# Patient Record
Sex: FEMALE | NOT HISPANIC OR LATINO | URBAN - METROPOLITAN AREA
[De-identification: names, ages, dates, MRNs, and addresses within clinical notes are randomized per-mention and may not be internally consistent; named-entity substitution may affect disease eponyms.]

---

## 2023-06-26 LAB — SARS-COV-2 RESULT: DETECTED

## 2023-11-28 ENCOUNTER — OFFICE VISIT (OUTPATIENT)
Dept: OBSTETRICS AND GYNECOLOGY | Facility: CLINIC | Age: 28
End: 2023-11-28
Payer: COMMERCIAL

## 2023-11-28 VITALS
WEIGHT: 143 LBS | HEIGHT: 69 IN | BODY MASS INDEX: 21.18 KG/M2 | DIASTOLIC BLOOD PRESSURE: 78 MMHG | SYSTOLIC BLOOD PRESSURE: 117 MMHG

## 2023-11-28 DIAGNOSIS — Z13.220 LIPID SCREENING: ICD-10-CM

## 2023-11-28 DIAGNOSIS — Z31.89 ENCOUNTER FOR FERTILITY PLANNING: ICD-10-CM

## 2023-11-28 DIAGNOSIS — Z01.419 WELL WOMAN EXAM WITH ROUTINE GYNECOLOGICAL EXAM: Primary | ICD-10-CM

## 2023-11-28 PROCEDURE — 99385 PREV VISIT NEW AGE 18-39: CPT | Performed by: OBSTETRICS & GYNECOLOGY

## 2023-11-28 PROCEDURE — 87624 HPV HI-RISK TYP POOLED RSLT: CPT

## 2023-11-28 PROCEDURE — 88175 CYTOPATH C/V AUTO FLUID REDO: CPT

## 2023-11-28 PROCEDURE — 1036F TOBACCO NON-USER: CPT | Performed by: OBSTETRICS & GYNECOLOGY

## 2023-11-28 RX ORDER — OMEPRAZOLE 40 MG/1
40 CAPSULE, DELAYED RELEASE ORAL AS NEEDED
COMMUNITY

## 2023-11-28 RX ORDER — DEXTROAMPHETAMINE SACCHARATE, AMPHETAMINE ASPARTATE, DEXTROAMPHETAMINE SULFATE AND AMPHETAMINE SULFATE 2.5; 2.5; 2.5; 2.5 MG/1; MG/1; MG/1; MG/1
10 TABLET ORAL AS NEEDED
COMMUNITY
Start: 2019-07-24

## 2023-11-28 ASSESSMENT — PAIN SCALES - GENERAL: PAINLEVEL: 0-NO PAIN

## 2023-11-28 NOTE — PROGRESS NOTES
History Of Present Illness  Routine Gyn Exam  Kaitlyn Olivo here for routine WWE. 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  Her only complaint with the Nexplanon has been acne.  Dysphoria before periods is improved.  Stopped OCPs due to Migraines with aura.    Pt is recently  and is not considering child birth now.  She would like to discuss egg freezing. She does not have a defined timeline-- maybe at least 3-4 years from now.    Her partner also has alpha-thallasemia trait (no issues).     Gynecologic History  Patient's last menstrual period was 2023 (exact date).  Contraception: Nexplanon placed 2022  Last Pap: . Results were:  normal  Last mammogram: NA. Results were: NA  Last Colonoscopy:  NA. Results were: NA  Last DEXA: NA. Results were: NA  Lipid/DM: NA    Obstetric History  OB History    Para Term  AB Living   0 0 0 0 0 0   SAB IAB Ectopic Multiple Live Births   0 0 0 0 0        Past Medical History  She has a past medical history of Allergy status to unspecified drugs, medicaments and biological substances, Attention deficit disorder (), Other specified health status, Personal history of other diseases of the nervous system and sense organs, Personal history of other drug therapy, and Personal history of other infectious and parasitic diseases.    Surgical History  She has a past surgical history that includes [place] tooth extraction ().     Social History  She reports that she has never smoked. She has never used smokeless tobacco. She reports current alcohol use. She reports that she does not use drugs.    Family History  No family history on file.     Allergies  Amoxicillin-pot clavulanate, Levonorgestrel-ethinyl estrad, and Pollen extracts       Physical Exam  Constitutional:       Appearance: Normal appearance.   Pulmonary:      Effort: Pulmonary effort is normal.   Chest:   Breasts:     Right: Normal. No mass, nipple discharge or skin change.      Left: Normal. No  "mass, nipple discharge or skin change.   Abdominal:      General: Abdomen is flat. There is no distension.      Palpations: Abdomen is soft.      Tenderness: There is no abdominal tenderness.   Genitourinary:     Labia:         Right: No rash, tenderness or lesion.         Left: No rash, tenderness or lesion.       Urethra: No prolapse.      Vagina: Normal. No vaginal discharge or bleeding.      Cervix: No friability or lesion.      Uterus: Normal. Not enlarged and not tender.       Adnexa:         Right: No mass, tenderness or fullness.          Left: No mass, tenderness or fullness.              Comments: Ectropion noted at 10-12 PM  Neurological:      General: No focal deficit present.      Mental Status: She is alert.   Psychiatric:         Mood and Affect: Mood normal.          Last Recorded Vitals  Blood pressure 117/78, height 1.753 m (5' 9\"), weight 64.9 kg (143 lb), last menstrual period 11/17/2023.    Assessment/Plan   Problem List Items Addressed This Visit    None  Visit Diagnoses       Well woman exam with routine gynecological exam    -  Primary    Relevant Orders    Lipid Panel    THINPREP PAP TEST    TSH with reflex to Free T4 if abnormal    CBC    Encounter for fertility planning        Relevant Orders    Hemoglobin Identification with Path Review    Lipid screening        Relevant Orders    Lipid Panel            Contraception: Nexplanon  PAP: obtained   Mammogram: NA   Colonoscopy:  NA   DEXA: NA   Lipid/DM: ordered    [facility] referral placed. Will also check Hgb electrophoresis and CBC for initial thalassemia screen. Pt will check to see how her spouse was diagnosed (what type of testing was performed).     Anmol Machado MD  "

## 2023-12-07 ENCOUNTER — LAB (OUTPATIENT)
Dept: LAB | Facility: LAB | Age: 28
End: 2023-12-07
Payer: COMMERCIAL

## 2023-12-07 DIAGNOSIS — Z01.419 WELL WOMAN EXAM WITH ROUTINE GYNECOLOGICAL EXAM: ICD-10-CM

## 2023-12-07 DIAGNOSIS — Z13.220 LIPID SCREENING: ICD-10-CM

## 2023-12-07 DIAGNOSIS — Z31.89 ENCOUNTER FOR FERTILITY PLANNING: ICD-10-CM

## 2023-12-07 LAB
CHOLEST SERPL-MCNC: 157 MG/DL (ref 0–199)
CHOLESTEROL/HDL RATIO: 2.3
ERYTHROCYTE [DISTWIDTH] IN BLOOD BY AUTOMATED COUNT: 12.1 % (ref 11.5–14.5)
HCT VFR BLD AUTO: 39.9 % (ref 36–46)
HDLC SERPL-MCNC: 68.7 MG/DL
HGB BLD-MCNC: 13 G/DL (ref 12–16)
LDLC SERPL CALC-MCNC: 79 MG/DL
MCH RBC QN AUTO: 28.3 PG (ref 26–34)
MCHC RBC AUTO-ENTMCNC: 32.6 G/DL (ref 32–36)
MCV RBC AUTO: 87 FL (ref 80–100)
NON HDL CHOLESTEROL: 88 MG/DL (ref 0–149)
NRBC BLD-RTO: 0 /100 WBCS (ref 0–0)
PLATELET # BLD AUTO: 321 X10*3/UL (ref 150–450)
RBC # BLD AUTO: 4.59 X10*6/UL (ref 4–5.2)
TRIGL SERPL-MCNC: 47 MG/DL (ref 0–149)
TSH SERPL-ACNC: 1.61 MIU/L (ref 0.44–3.98)
VLDL: 9 MG/DL (ref 0–40)
WBC # BLD AUTO: 6 X10*3/UL (ref 4.4–11.3)

## 2023-12-07 PROCEDURE — 83021 HEMOGLOBIN CHROMOTOGRAPHY: CPT

## 2023-12-07 PROCEDURE — 85027 COMPLETE CBC AUTOMATED: CPT

## 2023-12-07 PROCEDURE — 84443 ASSAY THYROID STIM HORMONE: CPT

## 2023-12-07 PROCEDURE — 36415 COLL VENOUS BLD VENIPUNCTURE: CPT

## 2023-12-07 PROCEDURE — 80061 LIPID PANEL: CPT

## 2023-12-07 PROCEDURE — 83020 HEMOGLOBIN ELECTROPHORESIS: CPT | Performed by: OBSTETRICS & GYNECOLOGY

## 2023-12-11 LAB
CYTOLOGY CMNT CVX/VAG CYTO-IMP: NORMAL
HEMOGLOBIN A2: 3.2 % (ref 2–3.5)
HEMOGLOBIN A: 96.3 % (ref 95.8–98)
HEMOGLOBIN F: 0.5 % (ref 0–2)
HEMOGLOBIN IDENTIFICATION INTERPRETATION: NORMAL
HPV HR 12 DNA GENITAL QL NAA+PROBE: NEGATIVE
HPV HR GENOTYPES PNL CVX NAA+PROBE: NEGATIVE
HPV16 DNA SPEC QL NAA+PROBE: NEGATIVE
HPV18 DNA SPEC QL NAA+PROBE: NEGATIVE
LAB AP HPV GENOTYPE QUESTION: YES
LAB AP HPV HR: NORMAL
LABORATORY COMMENT REPORT: NORMAL
LMP START DATE: NORMAL
PATH REPORT.TOTAL CANCER: NORMAL
PATH REVIEW-HGB IDENTIFICATION: NORMAL

## 2024-05-13 ENCOUNTER — OFFICE VISIT (OUTPATIENT)
Dept: PRIMARY CARE | Facility: CLINIC | Age: 29
End: 2024-05-13
Payer: COMMERCIAL

## 2024-05-13 VITALS
HEIGHT: 69 IN | HEART RATE: 67 BPM | DIASTOLIC BLOOD PRESSURE: 70 MMHG | SYSTOLIC BLOOD PRESSURE: 102 MMHG | WEIGHT: 141 LBS | BODY MASS INDEX: 20.88 KG/M2 | OXYGEN SATURATION: 97 %

## 2024-05-13 DIAGNOSIS — Z11.59 NEED FOR HEPATITIS C SCREENING TEST: ICD-10-CM
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
## 2024-05-13 DIAGNOSIS — Z14.8 CARRIER OF HEMOCHROMATOSIS HFE GENE MUTATION: Primary | ICD-10-CM

## 2024-05-13 DIAGNOSIS — Z11.3 ROUTINE SCREENING FOR STI (SEXUALLY TRANSMITTED INFECTION): ICD-10-CM

## 2024-05-13 PROCEDURE — 99385 PREV VISIT NEW AGE 18-39: CPT | Performed by: STUDENT IN AN ORGANIZED HEALTH CARE EDUCATION/TRAINING PROGRAM

## 2024-05-13 NOTE — PROGRESS NOTES
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  -Vegan diet  -No smoking     Family history:  -Biologically related to mother Sera who has history skin cancer  -Cardiac- grandfather with quadruple bypass   -Dementia  -No colon or breast cancer    Objective   Visit Vitals  /70   Pulse 67   Ht 1.753 m (5' 9\")   Wt 64 kg (141 lb)   SpO2 97%   BMI 20.82 kg/m²   OB Status Having periods   Smoking Status Never   BSA 1.77 m²      Physical Exam  General: Well appearing, conversational, in no acute distress  HEENT: EOMI, PERRL, nares patent without congestion, MMM  CV: RRR, no murmurs  Resp: Lungs CTAB, normal work of breathing  GI: Soft, nondistended, nontender, BS+   Ext: No lower ext swelling  Skin: Warm, dry, no rashes  Neuro: Awake, alert, oriented x3, moving all 4 extremities, nonfocal, normal gait, ambulates without assistance  Psych: Appropriate mood and affect      Assessment/Plan   Kaitlyn Olivo 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 Doing well without complaints today. Will get CMP and iron studies due to possible hemachromatosis variant seen on 23 and Me. She got labwork in December, will also get hep C, syphilis, and HIV screening labs to complete annual labs. Discussed trying to take omeprazole daily for 6 weeks to see " if this helps her GERD symptoms.     Problem List Items Addressed This Visit    None  Visit Diagnoses       Carrier of hemochromatosis HFE gene mutation    -  Primary    Relevant Orders    Comprehensive metabolic panel    Ferritin    Iron and TIBC    Need for hepatitis C screening test        Relevant Orders    Hepatitis C Antibody    Accident caused by hypodermic needle, subsequent encounter        Relevant Orders    Hepatitis C Antibody    HIV 1/2 Antigen/Antibody Screen with Reflex to Confirmation    Routine screening for STI (sexually transmitted infection)        Relevant Orders    Syphilis Screen with Reflex                 Courtney Mak MD MPH

## 2024-05-13 NOTE — PATIENT INSTRUCTIONS
Thank you for coming today Kaitlyn!    Please get your blood work done at any  lab. There is a lab on the lower level [address].    We discussed trying to take omeprazole every morning and vitamin at night.    I will see you once a year and as needed!    Please don't hesitate to reach out if you need anything!

## 2024-06-04 DIAGNOSIS — F41.9 ANXIETY: Primary | ICD-10-CM

## 2024-06-04 RX ORDER — SERTRALINE HYDROCHLORIDE 50 MG/1
50 TABLET, FILM COATED ORAL DAILY
Qty: 30 TABLET | Refills: 1 | Status: SHIPPED | OUTPATIENT
Start: 2024-06-04 | End: 2024-08-03

## 2024-06-18 ENCOUNTER — APPOINTMENT (OUTPATIENT)
Dept: OPHTHALMOLOGY | Facility: CLINIC | Age: 29
End: 2024-06-18
Payer: COMMERCIAL

## 2024-07-09 ENCOUNTER — APPOINTMENT (OUTPATIENT)
Dept: PRIMARY CARE | Facility: CLINIC | Age: 29
End: 2024-07-09
Payer: COMMERCIAL

## 2024-07-24 ENCOUNTER — APPOINTMENT (OUTPATIENT)
Dept: RADIOLOGY | Facility: HOSPITAL | Age: 29
End: 2024-07-24
Payer: COMMERCIAL

## 2024-07-24 ENCOUNTER — HOSPITAL ENCOUNTER (EMERGENCY)
Facility: HOSPITAL | Age: 29
Discharge: HOME | End: 2024-07-24
Attending: EMERGENCY MEDICINE
Payer: COMMERCIAL

## 2024-07-24 VITALS
WEIGHT: 140 LBS | SYSTOLIC BLOOD PRESSURE: 118 MMHG | HEART RATE: 88 BPM | DIASTOLIC BLOOD PRESSURE: 80 MMHG | BODY MASS INDEX: 20.73 KG/M2 | OXYGEN SATURATION: 100 % | TEMPERATURE: 99.1 F | HEIGHT: 69 IN | RESPIRATION RATE: 16 BRPM

## 2024-07-24 DIAGNOSIS — R10.9 FLANK PAIN: ICD-10-CM

## 2024-07-24 DIAGNOSIS — N94.89 ADNEXAL MASS: Primary | ICD-10-CM

## 2024-07-24 DIAGNOSIS — N83.202 CYST OF LEFT OVARY: Primary | ICD-10-CM

## 2024-07-24 LAB
ALBUMIN SERPL BCP-MCNC: 4.5 G/DL (ref 3.4–5)
ALP SERPL-CCNC: 64 U/L (ref 33–110)
ALT SERPL W P-5'-P-CCNC: 14 U/L (ref 7–45)
ANION GAP SERPL CALC-SCNC: 15 MMOL/L (ref 10–20)
APPEARANCE UR: CLEAR
AST SERPL W P-5'-P-CCNC: 18 U/L (ref 9–39)
BASOPHILS # BLD AUTO: 0.05 X10*3/UL (ref 0–0.1)
BASOPHILS NFR BLD AUTO: 0.5 %
BILIRUB SERPL-MCNC: 0.3 MG/DL (ref 0–1.2)
BILIRUB UR STRIP.AUTO-MCNC: NEGATIVE MG/DL
BUN SERPL-MCNC: 7 MG/DL (ref 6–23)
CALCIUM SERPL-MCNC: 9.5 MG/DL (ref 8.6–10.3)
CHLORIDE SERPL-SCNC: 104 MMOL/L (ref 98–107)
CO2 SERPL-SCNC: 24 MMOL/L (ref 21–32)
COLOR UR: NORMAL
CREAT SERPL-MCNC: 0.68 MG/DL (ref 0.5–1.05)
EGFRCR SERPLBLD CKD-EPI 2021: >90 ML/MIN/1.73M*2
EOSINOPHIL # BLD AUTO: 0.26 X10*3/UL (ref 0–0.7)
EOSINOPHIL NFR BLD AUTO: 2.8 %
ERYTHROCYTE [DISTWIDTH] IN BLOOD BY AUTOMATED COUNT: 12.4 % (ref 11.5–14.5)
GLUCOSE SERPL-MCNC: 84 MG/DL (ref 74–99)
GLUCOSE UR STRIP.AUTO-MCNC: NORMAL MG/DL
HCG UR QL IA.RAPID: NEGATIVE
HCT VFR BLD AUTO: 37.8 % (ref 36–46)
HGB BLD-MCNC: 12.5 G/DL (ref 12–16)
IMM GRANULOCYTES # BLD AUTO: 0.01 X10*3/UL (ref 0–0.7)
IMM GRANULOCYTES NFR BLD AUTO: 0.1 % (ref 0–0.9)
KETONES UR STRIP.AUTO-MCNC: NEGATIVE MG/DL
LEUKOCYTE ESTERASE UR QL STRIP.AUTO: NEGATIVE
LYMPHOCYTES # BLD AUTO: 2.92 X10*3/UL (ref 1.2–4.8)
LYMPHOCYTES NFR BLD AUTO: 31.2 %
MCH RBC QN AUTO: 28.5 PG (ref 26–34)
MCHC RBC AUTO-ENTMCNC: 33.1 G/DL (ref 32–36)
MCV RBC AUTO: 86 FL (ref 80–100)
MONOCYTES # BLD AUTO: 0.37 X10*3/UL (ref 0.1–1)
MONOCYTES NFR BLD AUTO: 4 %
NEUTROPHILS # BLD AUTO: 5.74 X10*3/UL (ref 1.2–7.7)
NEUTROPHILS NFR BLD AUTO: 61.4 %
NITRITE UR QL STRIP.AUTO: NEGATIVE
NRBC BLD-RTO: 0 /100 WBCS (ref 0–0)
PH UR STRIP.AUTO: 7.5 [PH]
PLATELET # BLD AUTO: 273 X10*3/UL (ref 150–450)
POTASSIUM SERPL-SCNC: 3.5 MMOL/L (ref 3.5–5.3)
PROT SERPL-MCNC: 6.7 G/DL (ref 6.4–8.2)
PROT UR STRIP.AUTO-MCNC: NEGATIVE MG/DL
RBC # BLD AUTO: 4.39 X10*6/UL (ref 4–5.2)
RBC # UR STRIP.AUTO: NEGATIVE /UL
SODIUM SERPL-SCNC: 139 MMOL/L (ref 136–145)
SP GR UR STRIP.AUTO: 1.01
UROBILINOGEN UR STRIP.AUTO-MCNC: NORMAL MG/DL
WBC # BLD AUTO: 9.4 X10*3/UL (ref 4.4–11.3)

## 2024-07-24 PROCEDURE — 2500000004 HC RX 250 GENERAL PHARMACY W/ HCPCS (ALT 636 FOR OP/ED): Performed by: NURSE PRACTITIONER

## 2024-07-24 PROCEDURE — 96361 HYDRATE IV INFUSION ADD-ON: CPT

## 2024-07-24 PROCEDURE — 2500000004 HC RX 250 GENERAL PHARMACY W/ HCPCS (ALT 636 FOR OP/ED): Performed by: EMERGENCY MEDICINE

## 2024-07-24 PROCEDURE — 85025 COMPLETE CBC W/AUTO DIFF WBC: CPT | Performed by: NURSE PRACTITIONER

## 2024-07-24 PROCEDURE — 81003 URINALYSIS AUTO W/O SCOPE: CPT | Performed by: NURSE PRACTITIONER

## 2024-07-24 PROCEDURE — 99253 IP/OBS CNSLTJ NEW/EST LOW 45: CPT | Performed by: STUDENT IN AN ORGANIZED HEALTH CARE EDUCATION/TRAINING PROGRAM

## 2024-07-24 PROCEDURE — 76856 US EXAM PELVIC COMPLETE: CPT

## 2024-07-24 PROCEDURE — 96376 TX/PRO/DX INJ SAME DRUG ADON: CPT

## 2024-07-24 PROCEDURE — 74176 CT ABD & PELVIS W/O CONTRAST: CPT | Performed by: RADIOLOGY

## 2024-07-24 PROCEDURE — 84075 ASSAY ALKALINE PHOSPHATASE: CPT | Performed by: NURSE PRACTITIONER

## 2024-07-24 PROCEDURE — 99285 EMERGENCY DEPT VISIT HI MDM: CPT | Mod: 25

## 2024-07-24 PROCEDURE — 81025 URINE PREGNANCY TEST: CPT | Performed by: NURSE PRACTITIONER

## 2024-07-24 PROCEDURE — 74176 CT ABD & PELVIS W/O CONTRAST: CPT

## 2024-07-24 PROCEDURE — 96374 THER/PROPH/DIAG INJ IV PUSH: CPT

## 2024-07-24 PROCEDURE — 36415 COLL VENOUS BLD VENIPUNCTURE: CPT | Performed by: NURSE PRACTITIONER

## 2024-07-24 RX ORDER — KETOROLAC TROMETHAMINE 30 MG/ML
15 INJECTION, SOLUTION INTRAMUSCULAR; INTRAVENOUS ONCE
Status: COMPLETED | OUTPATIENT
Start: 2024-07-24 | End: 2024-07-24

## 2024-07-24 ASSESSMENT — PAIN DESCRIPTION - PAIN TYPE: TYPE: ACUTE PAIN

## 2024-07-24 ASSESSMENT — COLUMBIA-SUICIDE SEVERITY RATING SCALE - C-SSRS
1. IN THE PAST MONTH, HAVE YOU WISHED YOU WERE DEAD OR WISHED YOU COULD GO TO SLEEP AND NOT WAKE UP?: NO
6. HAVE YOU EVER DONE ANYTHING, STARTED TO DO ANYTHING, OR PREPARED TO DO ANYTHING TO END YOUR LIFE?: NO
2. HAVE YOU ACTUALLY HAD ANY THOUGHTS OF KILLING YOURSELF?: NO

## 2024-07-24 ASSESSMENT — PAIN SCALES - GENERAL
PAINLEVEL_OUTOF10: 0 - NO PAIN
PAINLEVEL_OUTOF10: 4

## 2024-07-24 ASSESSMENT — PAIN DESCRIPTION - LOCATION: LOCATION: BACK

## 2024-07-24 ASSESSMENT — PAIN - FUNCTIONAL ASSESSMENT: PAIN_FUNCTIONAL_ASSESSMENT: 0-10

## 2024-07-24 ASSESSMENT — PAIN DESCRIPTION - ORIENTATION: ORIENTATION: LEFT

## 2024-07-24 NOTE — ED TRIAGE NOTES
PT PRESENTS TO THE ED FOR FLANK PAIN. PT ENDORSES MILD CRAMPING IN HER PELVIC AND BACK REGION. PT DENIES FEVERS OR CHILLS. PT DENIES CHEST PAIN OR SOB. PT ENDORSE MILD KIDNEY PAIN ON THE LEFT SIDE.

## 2024-07-24 NOTE — ED TRIAGE NOTES
This patient was seen in triage.     Vitals are noted.      HPI:  Patient is a 29-year-old female presenting to the emergency department with 5 days of left flank pain also with lower abdominal cramping, no history of kidney stones with believes it may be a kidney stone.  Denies any urinary urgency, frequency, burning.  Denies any fever, chills, chest pain, shortness of breath, nausea, vomiting.  Denies any hematuria.  Pain has been constant, denies any traumatic injury.    Focused PE:  Gen: Well-appearing, not in acute distress  Cardiovascular: Regular rate, normal rhythm, no murmur, no gallop  Respiratory: No adventitious lung sounds auscultated.  Abdomen: No reproducible abdominal tenderness upon palpation, + left CVA tenderness, physical exam may be limited by patient positioning sitting up in a chair  Neuro:  Alert and Oriented, speech clear and coherent     Plan:  IV, lab work, urinalysis, imaging        For the remainder of the patient's workup and ED course, please see the main ED provider note.  We discussed need for diagnostic testing including lab studies and imaging.  We also discussed that they may be asked to wait in the waiting room while these test are pending.  They understand that if they choose to leave without having the testing completed or resulted that we cannot rule out acute life-threatening illnesses and the risks involved to lead to worsening condition, permanent disability or even death.

## 2024-07-25 LAB — HOLD SPECIMEN: NORMAL

## 2024-07-25 NOTE — CONSULTS
Inpatient consult to Gynecology  Consult performed by: Damaso Modi MD  Consult ordered by: Mor Franz MD  Reason for consult: concern for Adnexal Torsion  Assessment/Recommendations: Imaging and histroy reviewed. Patient examied by ED provider and myself. No peritoneal signs. Pain is intermittent and not pelvic in location, US with no sonographic e/o torsion. There is a large lielky left adnexal mass c/f funtional ovarian cyst. Suspect that pain is either unrelated to the adnexal mass or due to mass effect. Intermittent torsion remains a possibnility but acute torsion in this setting is unlilkely.     Recommendations:  - OK to DC home with torsion precautions  - Tylenol/motrin ATC  - repeat US in 6 weeks with office follow up at that time.          Reason For Consult  IP GYN HPI/REASON FOR CONSULT: concern for adnexal torsion    History Of Present Illness  Kaitlyn Olivo is a 29 y.o. female presenting with IP GYN HPI/REASON FOR CONSULT: Adnexal Mass. Pt with 4 days of worsening abdominal pain. Pain is worse with deep penetration during sex. No hx of chronic ad or pelvic pain. No PMH/PSH. TVUS showed large cystic 6.5cm left adnedal mass. GYN consulted for r/o torsion     Past Medical History  She has a past medical history of Allergy status to unspecified drugs, medicaments and biological substances, Attention deficit disorder (), Other specified health status, Personal history of other diseases of the nervous system and sense organs, Personal history of other drug therapy, and Personal history of other infectious and parasitic diseases.    Surgical History  She has a past surgical history that includes [place] tooth extraction ().    OB History  OB History    Para Term  AB Living   0 0 0 0 0 0   SAB IAB Ectopic Multiple Live Births   0 0 0 0 0       Sexual History  Social History     Substance and Sexual Activity   Sexual Activity Yes    Partners: Male    Birth control/protection:  "Implant        Social History  She reports that she has never smoked. She has never used smokeless tobacco. She reports current alcohol use. She reports that she does not use drugs.    Family History  No family history on file.     Allergies  Amoxicillin-pot clavulanate, Levonorgestrel-ethinyl estrad, and Pollen extracts    Review of Systems   Genitourinary:  Positive for pelvic pain.        Physical Exam  Vitals reviewed.   Constitutional:       General: She is not in acute distress.     Appearance: She is not ill-appearing.   Pulmonary:      Effort: Pulmonary effort is normal.   Abdominal:      General: Abdomen is flat. There is no distension.      Palpations: Abdomen is soft. There is no mass.      Tenderness: There is no abdominal tenderness. There is no right CVA tenderness, left CVA tenderness, guarding or rebound.   Skin:     Coloration: Skin is not pale.   Neurological:      Mental Status: She is alert.          Last Recorded Vitals  Blood pressure 118/80, pulse 88, temperature 37.3 °C (99.1 °F), resp. rate 16, height 1.753 m (5' 9\"), weight 63.5 kg (140 lb), SpO2 100%.    Relevant Results      Results for orders placed or performed during the hospital encounter of 07/24/24 (from the past 24 hour(s))   CBC and Auto Differential   Result Value Ref Range    WBC 9.4 4.4 - 11.3 x10*3/uL    nRBC 0.0 0.0 - 0.0 /100 WBCs    RBC 4.39 4.00 - 5.20 x10*6/uL    Hemoglobin 12.5 12.0 - 16.0 g/dL    Hematocrit 37.8 36.0 - 46.0 %    MCV 86 80 - 100 fL    MCH 28.5 26.0 - 34.0 pg    MCHC 33.1 32.0 - 36.0 g/dL    RDW 12.4 11.5 - 14.5 %    Platelets 273 150 - 450 x10*3/uL    Neutrophils % 61.4 40.0 - 80.0 %    Immature Granulocytes %, Automated 0.1 0.0 - 0.9 %    Lymphocytes % 31.2 13.0 - 44.0 %    Monocytes % 4.0 2.0 - 10.0 %    Eosinophils % 2.8 0.0 - 6.0 %    Basophils % 0.5 0.0 - 2.0 %    Neutrophils Absolute 5.74 1.20 - 7.70 x10*3/uL    Immature Granulocytes Absolute, Automated 0.01 0.00 - 0.70 x10*3/uL    Lymphocytes " Absolute 2.92 1.20 - 4.80 x10*3/uL    Monocytes Absolute 0.37 0.10 - 1.00 x10*3/uL    Eosinophils Absolute 0.26 0.00 - 0.70 x10*3/uL    Basophils Absolute 0.05 0.00 - 0.10 x10*3/uL   Comprehensive metabolic panel   Result Value Ref Range    Glucose 84 74 - 99 mg/dL    Sodium 139 136 - 145 mmol/L    Potassium 3.5 3.5 - 5.3 mmol/L    Chloride 104 98 - 107 mmol/L    Bicarbonate 24 21 - 32 mmol/L    Anion Gap 15 10 - 20 mmol/L    Urea Nitrogen 7 6 - 23 mg/dL    Creatinine 0.68 0.50 - 1.05 mg/dL    eGFR >90 >60 mL/min/1.73m*2    Calcium 9.5 8.6 - 10.3 mg/dL    Albumin 4.5 3.4 - 5.0 g/dL    Alkaline Phosphatase 64 33 - 110 U/L    Total Protein 6.7 6.4 - 8.2 g/dL    AST 18 9 - 39 U/L    Bilirubin, Total 0.3 0.0 - 1.2 mg/dL    ALT 14 7 - 45 U/L   hCG, Urine, Qualitative   Result Value Ref Range    HCG, Urine NEGATIVE NEGATIVE   Urinalysis with Reflex Culture and Microscopic   Result Value Ref Range    Color, Urine Light-Yellow Light-Yellow, Yellow, Dark-Yellow    Appearance, Urine Clear Clear    Specific Gravity, Urine 1.006 1.005 - 1.035    pH, Urine 7.5 5.0, 5.5, 6.0, 6.5, 7.0, 7.5, 8.0    Protein, Urine NEGATIVE NEGATIVE, 10 (TRACE), 20 (TRACE) mg/dL    Glucose, Urine Normal Normal mg/dL    Blood, Urine NEGATIVE NEGATIVE    Ketones, Urine NEGATIVE NEGATIVE mg/dL    Bilirubin, Urine NEGATIVE NEGATIVE    Urobilinogen, Urine Normal Normal mg/dL    Nitrite, Urine NEGATIVE NEGATIVE    Leukocyte Esterase, Urine NEGATIVE NEGATIVE     US PELVIS TRANSABDOMINAL WITH TRANSVAGINAL    Result Date: 7/24/2024  Interpreted By:  Anupam Avery, STUDY: US PELVIS TRANSABDOMINAL WITH TRANSVAGINAL;  7/24/2024 7:47 pm   INDICATION: Signs/Symptoms:large left adnexal cyst.   COMPARISON: CT scan of the abdomen pelvis 07/24/2024.   ACCESSION NUMBER(S): [id number]   ORDERING CLINICIAN: DENZEL MUNIZ   TECHNIQUE: Transabdominal  and transvaginal sonographic images of the pelvis. Spectral Doppler imaging.   FINDINGS: UTERUS: Measures 8.0 x 4.5 x  4.7 cm and is retroverted in position. No discrete myometrial mass is seen. Cervical nabothian cysts noted. ENDOMETRIUM: Normal thickness, 8 mm. RIGHT OVARY: Measures 4.0 x 2.3 x 1.6 cm. Follicular changes noted with dominant follicle measuring up to 1.7 cm. Normal arterial and venous flow present. LEFT OVARY: Measures 6.6 x 4.4 x 6.8 cm. There is a large anechoic focus in the left ovary with posterior acoustic enhancement measuring 5.7 x 4.0 x 6.2 cm. This corresponds to findings on recent CT and likely relates to a functional ovarian cyst. Small rim of ovarian tissue is noted which demonstrates flow. CUL DE SAC: No free-fluid.       There is a 5.7 x 4.0 x 6.2 cm cystic structure in the left adnexa corresponding to findings on recent CT likely relate to a functional ovarian cyst. There is a thin rim of ovarian tissue which demonstrates flow on color and spectral Doppler imaging. Of note given size this places the left ovary at increased risk for torsion. Correlate with recurrent symptomatology for the need for repeat imaging.   Unremarkable sonographic appearance of the uterus and right ovary.   MACRO: None   Signed by: Anupam Avery 7/24/2024 8:12 PM Dictation workstation:   [id number]    CT abdomen pelvis wo IV contrast    Result Date: 7/24/2024  Interpreted By:  Per Godoy, STUDY: CT ABDOMEN PELVIS WO IV CONTRAST;  7/24/2024 6:18 pm   INDICATION: Signs/Symptoms:left flank pain.   COMPARISON: None.   ACCESSION NUMBER(S): [id number]   ORDERING CLINICIAN: DENZEL MUNIZ   TECHNIQUE: CT of the abdomen and pelvis was performed. Contiguous axial images were obtained at 3 mm slice thickness through the abdomen and pelvis. Coronal and sagittal reconstructions at 3 mm slice thickness were performed.  No intravenous contrast was administered.   FINDINGS: Please note that the evaluation of vessels, lymph nodes and organs is limited without intravenous contrast.   LOWER CHEST: Mild bibasilar atelectatic change.    ABDOMEN:   LIVER: Unremarkable.   BILE DUCTS: No significant biliary dilatation.   GALLBLADDER: No calcified gallstone is seen.   PANCREAS: Unremarkable.   SPLEEN: Unremarkable   ADRENAL GLANDS: Unremarkable   KIDNEYS AND URETERS: Unremarkable. No hydroureteronephrosis or nephroureterolithiasis is identified.   PELVIS:   BLADDER: Unremarkable.   REPRODUCTIVE ORGANS: Left posterior adnexal cystic mass measuring up to 6.5 x 4.9 x 5.6 cm.   BOWEL: No abnormal bowel wall thickening. No pathologic distention of large or small bowel.   VESSELS: No evidence of aortic aneurysm.   PERITONEUM/RETROPERITONEUM/LYMPH NODES: No ascites or free air, no fluid collection.  No enlarged mesenteric lymph nodes.   ABDOMINAL WALL: Small fat containing periumbilical hernia.   BONES: No acute osseous abnormality.       1.  No obstructing urolithiasis or significant nephrolithiasis. 2. Left posterior adnexal cystic mass measuring up to 6.5 x 4.9 x 5.6 cm, likely representing a large ovarian cyst. Pelvic ultrasound is advised for further assessment.     Signed by: Per Godoy 7/24/2024 6:30 PM Dictation workstation:   [id number]        Assessment/Plan   Imaging and history reviewed in detail. US images reviewed and with no e/o acute torsion. Pt with minimal pain on interview and benign abd exam.  Low concern for acute torsion at this time. Will DC h9ome with strcuit return rpecautions and plan for US and clinci follow up in 6 weeks.    I spent 30 minutes in the professional and overall care of this patient.

## 2024-07-25 NOTE — DISCHARGE INSTRUCTIONS
You were seen emergency room today for evaluation of left lower back and flank pain.  Your labs and imaging were overall reassuring.  Your imaging does display 5.7 x 4.0 x 6.2 cm cystic structure in the left adnexa.  Please follow-up with gynecology outpatient regarding this finding.  Please return to the emergency room if you have any episodes of worsening pain, pain with nausea vomiting, fatigue, abnormal vaginal discharge, or if you have any concerns.  Please trial pelvic rest as discussed with gynecology.

## 2024-07-25 NOTE — ED PROVIDER NOTES
History/Exam limitations: none.   Additional history was obtained from patient.          HPI:    Kaitlyn Olivo is a 29 y.o. female present for evaluation of left flank/lower back pain.  Patient states that she did notice that she is having some discomfort in the area over the last month.  In the last few days has become more noticeable.  Today while in clinic noticed significant increase in pain primarily in her left low back area.  Felt worse with sitting and standing.  No reproducibility.  No recent trauma.  No similar prior episodes.  Monogamous sexual relationship with her long-term male partner.  Has had some discomfort with penetration recently.  No new vaginal discharge.  No vaginal bleeding.  Pain has been fluctuate intensity throughout the day today.  No radiation pain down the leg.  No focal weakness or numbness.  No nausea, vomiting, fever, chills, chest pain, shortness of breath, intra-abdominal pain.  No dysuria.         Physical Exam:  ED Triage Vitals   Temperature Heart Rate Respirations BP   07/24/24 1704 07/24/24 1704 07/24/24 1704 07/24/24 1705   37.3 °C (99.1 °F) 88 16 118/80      Pulse Ox Temp src Heart Rate Source Patient Position   07/24/24 1704 -- -- --   100 %         BP Location FiO2 (%)     -- --              GEN:      Alert, NAD  Eyes:       PERRL, EOMI  HENT:      NC/AT, OP clear, airway patent, MM  CV:      RRR, no MRG, no LE pitting edema, 2+ radial and pedal pulses  PULM:     CTAB, no w/r/r, easy WOB, symmetric chest rise  ABD:      Soft, NT, ND, no masses, BS +  :       No CVA TTP  NEURO:   A&Ox3, no focal deficits    MSK:      FROM, no joint deformities or swelling, no e/o trauma, no significant tenderness to the left paraspinal musculature, no midline tenderness or step-offs  SKIN:       Warm and dry  PSYCH:    Appropriate mood and affect         MDM/ED Course:   Kaitlyn Olivo is a 29 y.o. female present for evaluation of left flank/lower back pain.  Vitals reassuring.  Exam as  documented above.  Differential for abdominal pain includes but is not limited to SBO, incarcerated hernia, pancreatitis, viscous organ rupture, mesenteric ischemia, aortic pathology, biliary pathology, diverticulitis, appendicitis, UTI/pyelonephritis, nephrolithiasis, viral illness.  Differential is ovarian torsion.  Differential is PID/TOA or other GYN related pathology.  Lower sexual history, suspicion for PID or TOA.  Patient's workup was initiated in the triage area.  Prior to my assessment patient had labs and received IV fluids and 15 mg IV Toradol.  CBC with no leukocytosis or significant anemia.  Chemistry is reassuring liver enzymes, reassuring renal function.  hCG negative.  Urinalysis with no evidence of UTI, no blood in urine.  CT abdomen pelvis without contrast had also been obtained and no nephrolithiasis noted however there is a left posterior adnexal cystic mass 6.5 x 4.9 x 5.6 cm likely a large ovarian cyst.  My initial assessment, patient states her pain was improved but was still having some pain.  No tenderness in the left lower back area but patient is having primary symptoms, no CVA tenderness, no anterior abdominal tenderness.  Low suspicion for cauda equina or other acute spinal cord pathology given location of pain, lack of neurodeficits, quality of symptoms, exam.  Given the lack reproducibility, suspect that patient's large ovarian cyst is causing symptoms.  Ovarian torsion is on the differential.  Pelvic ultrasound had been obtained as well and there is ovarian tissue noted around the cystic structure that is 5.7 x 4.0 x 6.2 cm in the left adnexa.  Likely functional ovarian cyst.  The ovarian tissue surrounding the cyst displays Doppler flow.  Patient did have some increased pain and additional 15 mg IV Toradol given.  Dr. Modi from GYN was consulted and came into evaluate patient.  Examined patient and suspect the symptoms are related to the cyst however low suspicion for active  torsion at this time.  Recommends discharge home with torsion precautions, Tylenol and Motrin around-the-clock.  Outpatient follow-up with GYN.     Diagnoses as of 07/26/24 1334   Cyst of left ovary   Flank pain         Chronic medical conditions affecting care listed in MDM. I independently reviewed imaging studies and agreed with radiology reads. I reviewed recent and relevant outside records including PCP notes, Prior discharge summaries, and prior radiology reports.    Procedure  Procedures    Diagnosis:   1.  Large ovarian cyst  2.  Left flank pain    Dispo: Discharged in stable condition      Disclaimer: Portions of this note were dictated by speech recognition. An attempt at proof reading was made to minimize errors. Minor errors in transcription may be present.  Please call if questions.     Mor Franz MD  07/26/24 5570

## 2024-08-06 DIAGNOSIS — F41.9 ANXIETY: ICD-10-CM

## 2024-08-07 RX ORDER — SERTRALINE HYDROCHLORIDE 50 MG/1
50 TABLET, FILM COATED ORAL DAILY
Qty: 90 TABLET | Refills: 3 | Status: SHIPPED | OUTPATIENT
Start: 2024-08-07 | End: 2025-08-07

## 2024-08-26 ENCOUNTER — HOSPITAL ENCOUNTER (OUTPATIENT)
Dept: RADIOLOGY | Facility: HOSPITAL | Age: 29
Discharge: HOME | End: 2024-08-26
Payer: COMMERCIAL

## 2024-08-26 ENCOUNTER — LAB (OUTPATIENT)
Dept: LAB | Facility: LAB | Age: 29
End: 2024-08-26
Payer: COMMERCIAL

## 2024-08-26 DIAGNOSIS — Z11.3 ROUTINE SCREENING FOR STI (SEXUALLY TRANSMITTED INFECTION): ICD-10-CM

## 2024-08-26 DIAGNOSIS — Z14.8 CARRIER OF HEMOCHROMATOSIS HFE GENE MUTATION: ICD-10-CM
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
## 2024-08-26 DIAGNOSIS — Z11.59 NEED FOR HEPATITIS C SCREENING TEST: ICD-10-CM

## 2024-08-26 DIAGNOSIS — N94.89 ADNEXAL MASS: ICD-10-CM

## 2024-08-26 LAB
ALBUMIN SERPL BCP-MCNC: 4.5 G/DL (ref 3.4–5)
ALP SERPL-CCNC: 61 U/L (ref 33–110)
ALT SERPL W P-5'-P-CCNC: 15 U/L (ref 7–45)
ANION GAP SERPL CALC-SCNC: 13 MMOL/L (ref 10–20)
AST SERPL W P-5'-P-CCNC: 17 U/L (ref 9–39)
BILIRUB SERPL-MCNC: 0.2 MG/DL (ref 0–1.2)
BUN SERPL-MCNC: 9 MG/DL (ref 6–23)
CALCIUM SERPL-MCNC: 10.5 MG/DL (ref 8.6–10.3)
CHLORIDE SERPL-SCNC: 103 MMOL/L (ref 98–107)
CO2 SERPL-SCNC: 27 MMOL/L (ref 21–32)
CREAT SERPL-MCNC: 0.69 MG/DL (ref 0.5–1.05)
EGFRCR SERPLBLD CKD-EPI 2021: >90 ML/MIN/1.73M*2
GLUCOSE SERPL-MCNC: 79 MG/DL (ref 74–99)
IRON SATN MFR SERPL: 8 % (ref 25–45)
IRON SERPL-MCNC: 28 UG/DL (ref 35–150)
POTASSIUM SERPL-SCNC: 4.3 MMOL/L (ref 3.5–5.3)
PROT SERPL-MCNC: 7 G/DL (ref 6.4–8.2)
SODIUM SERPL-SCNC: 139 MMOL/L (ref 136–145)
TIBC SERPL-MCNC: 358 UG/DL (ref 240–445)
UIBC SERPL-MCNC: 330 UG/DL (ref 110–370)

## 2024-08-26 PROCEDURE — 82728 ASSAY OF FERRITIN: CPT

## 2024-08-26 PROCEDURE — 86780 TREPONEMA PALLIDUM: CPT

## 2024-08-26 PROCEDURE — 87389 HIV-1 AG W/HIV-1&-2 AB AG IA: CPT

## 2024-08-26 PROCEDURE — 36415 COLL VENOUS BLD VENIPUNCTURE: CPT

## 2024-08-26 PROCEDURE — 80053 COMPREHEN METABOLIC PANEL: CPT

## 2024-08-26 PROCEDURE — 83550 IRON BINDING TEST: CPT

## 2024-08-26 PROCEDURE — 76856 US EXAM PELVIC COMPLETE: CPT

## 2024-08-26 PROCEDURE — 76830 TRANSVAGINAL US NON-OB: CPT | Performed by: RADIOLOGY

## 2024-08-26 PROCEDURE — 83540 ASSAY OF IRON: CPT

## 2024-08-26 PROCEDURE — 86803 HEPATITIS C AB TEST: CPT

## 2024-08-26 PROCEDURE — 76856 US EXAM PELVIC COMPLETE: CPT | Performed by: RADIOLOGY

## 2024-08-27 LAB
FERRITIN SERPL-MCNC: 41 NG/ML (ref 8–150)
HCV AB SER QL: NONREACTIVE
HIV 1+2 AB+HIV1 P24 AG SERPL QL IA: NONREACTIVE
TREPONEMA PALLIDUM IGG+IGM AB [PRESENCE] IN SERUM OR PLASMA BY IMMUNOASSAY: NONREACTIVE

## 2024-09-04 ENCOUNTER — APPOINTMENT (OUTPATIENT)
Dept: OBSTETRICS AND GYNECOLOGY | Facility: CLINIC | Age: 29
End: 2024-09-04
Payer: COMMERCIAL

## 2024-09-04 VITALS
SYSTOLIC BLOOD PRESSURE: 116 MMHG | HEIGHT: 69 IN | BODY MASS INDEX: 21.21 KG/M2 | DIASTOLIC BLOOD PRESSURE: 78 MMHG | WEIGHT: 143.2 LBS

## 2024-09-04 DIAGNOSIS — Z09 FOLLOW-UP EXAM: Primary | ICD-10-CM

## 2024-09-04 DIAGNOSIS — G43.109 MIGRAINE WITH AURA AND WITHOUT STATUS MIGRAINOSUS, NOT INTRACTABLE: ICD-10-CM

## 2024-09-04 DIAGNOSIS — Z30.011 BCP (BIRTH CONTROL PILLS) INITIATION: ICD-10-CM

## 2024-09-04 DIAGNOSIS — F32.81 PMDD (PREMENSTRUAL DYSPHORIC DISORDER): ICD-10-CM

## 2024-09-04 DIAGNOSIS — Z30.46 NEXPLANON REMOVAL: ICD-10-CM

## 2024-09-04 DIAGNOSIS — L70.0 ACNE VULGARIS: ICD-10-CM

## 2024-09-04 PROCEDURE — 11982 REMOVE DRUG IMPLANT DEVICE: CPT | Performed by: STUDENT IN AN ORGANIZED HEALTH CARE EDUCATION/TRAINING PROGRAM

## 2024-09-04 PROCEDURE — 99214 OFFICE O/P EST MOD 30 MIN: CPT | Performed by: STUDENT IN AN ORGANIZED HEALTH CARE EDUCATION/TRAINING PROGRAM

## 2024-09-04 PROCEDURE — 3008F BODY MASS INDEX DOCD: CPT | Performed by: STUDENT IN AN ORGANIZED HEALTH CARE EDUCATION/TRAINING PROGRAM

## 2024-09-04 PROCEDURE — 1036F TOBACCO NON-USER: CPT | Performed by: STUDENT IN AN ORGANIZED HEALTH CARE EDUCATION/TRAINING PROGRAM

## 2024-09-04 ASSESSMENT — PATIENT HEALTH QUESTIONNAIRE - PHQ9
2. FEELING DOWN, DEPRESSED OR HOPELESS: NOT AT ALL
1. LITTLE INTEREST OR PLEASURE IN DOING THINGS: NOT AT ALL
SUM OF ALL RESPONSES TO PHQ9 QUESTIONS 1 AND 2: 0

## 2024-09-04 ASSESSMENT — ENCOUNTER SYMPTOMS
ENDOCRINE NEGATIVE: 0
HEMATOLOGIC/LYMPHATIC NEGATIVE: 0
CONSTITUTIONAL NEGATIVE: 0
ALLERGIC/IMMUNOLOGIC NEGATIVE: 0
GASTROINTESTINAL NEGATIVE: 0
PSYCHIATRIC NEGATIVE: 0
CARDIOVASCULAR NEGATIVE: 0
RESPIRATORY NEGATIVE: 0
EYES NEGATIVE: 0
MUSCULOSKELETAL NEGATIVE: 0
NEUROLOGICAL NEGATIVE: 0

## 2024-09-04 ASSESSMENT — PAIN SCALES - GENERAL: PAINLEVEL: 0-NO PAIN

## 2024-09-04 NOTE — PROGRESS NOTES
Subjective   Patient ID: Kaitlyn Olivo is a 29 y.o. female who presents for ER Follow-up (Last pap 11/28/23 wnl. HPV Neg. ).  Patient here for ED follow-up.  Patient reports that her pelvic pain has resolved and is now only intermittent.  She requests Nexplanon removal.  She also endorses a history of PMDD that is well-controlled with luteal phase sertraline.  She also endorses problems with acne.  She would like to transition to an oral contraceptive pill.    ER Follow-up        Review of Systems   All other systems reviewed and are negative.      Objective   Physical Exam  Vitals reviewed.   Constitutional:       General: She is not in acute distress.     Appearance: She is not ill-appearing.   Pulmonary:      Effort: Pulmonary effort is normal.   Skin:     Coloration: Skin is not pale.   Neurological:      Mental Status: She is alert.         Assessment/Plan   Diagnoses and all orders for this visit:  Follow-up exam  Acne vulgaris  PMDD (premenstrual dysphoric disorder)  Nexplanon removal  Migraine with aura and without status migrainosus, not intractable  BCP (birth control pills) initiation  -     drospirenone, contraceptive, 4 mg (28) tablet; Take 1 tablet by mouth once daily.    Patient here for ED follow-up.  Acute pelvic pain has resolved.  Reviewed repeat ultrasound imaging which showed physiologic cysts with no complexity.  Will consider repeat ultrasound as indicated.  Nexplanon removed as below per patient preference.  Will transition patient to an oral contraceptive pill.  Patient is not a candidate for ethinyl estradiol based contraceptives given her history of migraine with aura.  Given her history of PMDD as well as this adult acne patient would likely benefit from a drospirenone containing contraceptive.  Thus we will send prescription for Slynd.    Patient ID: Kaitlyn Olivo is a 29 y.o. female.    Insertion of Contraceptive Capsule    Date/Time: 9/4/2024 7:56 PM    Performed by: Damaso  MD Rian  Authorized by: Damaso Modi MD    Consent:     Consent obtained:  Verbal    Consent given by:  Patient    Procedural risks discussed:  Bleeding, damage to other organs and infection    Patient questions answered: yes      Patient agrees, verbalizes understanding, and wants to proceed: yes    Indication:     Indication: Presence of non-biodegradable drug delivery implant    Pre-procedure:     Prepped with: povidone-iodine      Local anesthetic:  Lidocaine with epinephrine    The site was cleaned and prepped in a sterile fashion: yes    Procedure:     Procedure:  Removal    Small stab incision was made in arm: yes      Left/right:  Left    Visualization of implant was obtained: yes      Site was closed with steri-strips and pressure bandage applied: yes    Comments:      Uncomplicated Nexplanon removal         Damaso Modi MD 09/04/24 7:55 PM

## 2024-09-17 ENCOUNTER — DOCUMENTATION (OUTPATIENT)
Dept: OBSTETRICS AND GYNECOLOGY | Facility: CLINIC | Age: 29
End: 2024-09-17
Payer: COMMERCIAL

## 2024-09-17 PROBLEM — F32.81 PMDD (PREMENSTRUAL DYSPHORIC DISORDER): Status: ACTIVE | Noted: 2024-09-04

## 2024-09-17 PROBLEM — G43.109 MIGRAINE WITH AURA: Status: ACTIVE | Noted: 2024-09-17

## 2024-09-17 PROBLEM — F32.81 PMDD (PREMENSTRUAL DYSPHORIC DISORDER): Status: ACTIVE | Noted: 2024-09-17

## 2024-09-17 NOTE — PROGRESS NOTES
PA submitted for Slynd 4mg  PA Case ID #: [id number]  [employer] has not yet replied to your PA request. You may close this dialog, return to your dashboard, and perform other tasks.    To check for an update later, open this request again from your dashboard.    If [employer] has not replied to your request within 24-72 hours please contact [employer] at [phone]  Radha Villafuerte RN

## 2024-09-20 ENCOUNTER — TELEPHONE (OUTPATIENT)
Dept: OBSTETRICS AND GYNECOLOGY | Facility: CLINIC | Age: 29
End: 2024-09-20
Payer: COMMERCIAL

## 2024-09-20 NOTE — PROGRESS NOTES
N/A on September 17 by [employer] 2017  This request has been closed by the Patient's Pharmacy Benefit Manager. Call Pharmacy Customer Care at [phone]. PA Not Required.

## 2024-09-20 NOTE — TELEPHONE ENCOUNTER
N/A on September 17 by [employer] 2017  This request has been closed by the Patient's Pharmacy Benefit Manager. Call Pharmacy Customer Care at [phone]. PA Not Required.    RN called Pharmacy and spoke to Pharmacist  Medication covered with a $0 copay  Radha Villafuerte RN

## 2024-10-24 ENCOUNTER — HOSPITAL ENCOUNTER (EMERGENCY)
Facility: HOSPITAL | Age: 29
Discharge: HOME | End: 2024-10-24
Payer: COMMERCIAL

## 2024-10-24 ENCOUNTER — CLINICAL SUPPORT (OUTPATIENT)
Dept: EMERGENCY MEDICINE | Facility: HOSPITAL | Age: 29
End: 2024-10-24
Payer: COMMERCIAL

## 2024-10-24 VITALS
HEIGHT: 68 IN | TEMPERATURE: 98.4 F | WEIGHT: 140 LBS | BODY MASS INDEX: 21.22 KG/M2 | HEART RATE: 92 BPM | OXYGEN SATURATION: 98 % | RESPIRATION RATE: 16 BRPM | DIASTOLIC BLOOD PRESSURE: 82 MMHG | SYSTOLIC BLOOD PRESSURE: 125 MMHG

## 2024-10-24 DIAGNOSIS — R00.0 RAPID HEART RATE: Primary | ICD-10-CM

## 2024-10-24 LAB
ALBUMIN SERPL BCP-MCNC: 4.8 G/DL (ref 3.4–5)
ALP SERPL-CCNC: 61 U/L (ref 33–110)
ALT SERPL W P-5'-P-CCNC: 12 U/L (ref 7–45)
ANION GAP SERPL CALC-SCNC: 15 MMOL/L (ref 10–20)
AST SERPL W P-5'-P-CCNC: 21 U/L (ref 9–39)
ATRIAL RATE: 104 BPM
BASOPHILS # BLD AUTO: 0.06 X10*3/UL (ref 0–0.1)
BASOPHILS NFR BLD AUTO: 1 %
BILIRUB SERPL-MCNC: 0.4 MG/DL (ref 0–1.2)
BUN SERPL-MCNC: 11 MG/DL (ref 6–23)
CALCIUM SERPL-MCNC: 10 MG/DL (ref 8.6–10.6)
CARDIAC TROPONIN I PNL SERPL HS: <3 NG/L (ref 0–34)
CHLORIDE SERPL-SCNC: 104 MMOL/L (ref 98–107)
CO2 SERPL-SCNC: 24 MMOL/L (ref 21–32)
CREAT SERPL-MCNC: 0.71 MG/DL (ref 0.5–1.05)
EGFRCR SERPLBLD CKD-EPI 2021: >90 ML/MIN/1.73M*2
EOSINOPHIL # BLD AUTO: 0.12 X10*3/UL (ref 0–0.7)
EOSINOPHIL NFR BLD AUTO: 2.1 %
ERYTHROCYTE [DISTWIDTH] IN BLOOD BY AUTOMATED COUNT: 11.8 % (ref 11.5–14.5)
GLUCOSE SERPL-MCNC: 76 MG/DL (ref 74–99)
HCT VFR BLD AUTO: 40.2 % (ref 36–46)
HGB BLD-MCNC: 13 G/DL (ref 12–16)
IMM GRANULOCYTES # BLD AUTO: 0.01 X10*3/UL (ref 0–0.7)
IMM GRANULOCYTES NFR BLD AUTO: 0.2 % (ref 0–0.9)
LYMPHOCYTES # BLD AUTO: 2.06 X10*3/UL (ref 1.2–4.8)
LYMPHOCYTES NFR BLD AUTO: 35.9 %
MCH RBC QN AUTO: 28.3 PG (ref 26–34)
MCHC RBC AUTO-ENTMCNC: 32.3 G/DL (ref 32–36)
MCV RBC AUTO: 87 FL (ref 80–100)
MONOCYTES # BLD AUTO: 0.3 X10*3/UL (ref 0.1–1)
MONOCYTES NFR BLD AUTO: 5.2 %
NEUTROPHILS # BLD AUTO: 3.19 X10*3/UL (ref 1.2–7.7)
NEUTROPHILS NFR BLD AUTO: 55.6 %
NRBC BLD-RTO: 0 /100 WBCS (ref 0–0)
P AXIS: 89 DEGREES
P OFFSET: 197 MS
P ONSET: 146 MS
PLATELET # BLD AUTO: 278 X10*3/UL (ref 150–450)
POTASSIUM SERPL-SCNC: 3.7 MMOL/L (ref 3.5–5.3)
PR INTERVAL: 156 MS
PROT SERPL-MCNC: 7.9 G/DL (ref 6.4–8.2)
Q ONSET: 224 MS
QRS COUNT: 17 BEATS
QRS DURATION: 82 MS
QT INTERVAL: 336 MS
QTC CALCULATION(BAZETT): 441 MS
QTC FREDERICIA: 403 MS
R AXIS: 75 DEGREES
RBC # BLD AUTO: 4.6 X10*6/UL (ref 4–5.2)
SODIUM SERPL-SCNC: 139 MMOL/L (ref 136–145)
T AXIS: 76 DEGREES
T OFFSET: 392 MS
TSH SERPL-ACNC: 2.58 MIU/L (ref 0.44–3.98)
VENTRICULAR RATE: 104 BPM
WBC # BLD AUTO: 5.7 X10*3/UL (ref 4.4–11.3)

## 2024-10-24 PROCEDURE — 84075 ASSAY ALKALINE PHOSPHATASE: CPT

## 2024-10-24 PROCEDURE — 84443 ASSAY THYROID STIM HORMONE: CPT

## 2024-10-24 PROCEDURE — 36415 COLL VENOUS BLD VENIPUNCTURE: CPT

## 2024-10-24 PROCEDURE — 99283 EMERGENCY DEPT VISIT LOW MDM: CPT

## 2024-10-24 PROCEDURE — 99285 EMERGENCY DEPT VISIT HI MDM: CPT

## 2024-10-24 PROCEDURE — 93005 ELECTROCARDIOGRAM TRACING: CPT

## 2024-10-24 PROCEDURE — 85025 COMPLETE CBC W/AUTO DIFF WBC: CPT

## 2024-10-24 PROCEDURE — 84484 ASSAY OF TROPONIN QUANT: CPT

## 2024-10-24 ASSESSMENT — COLUMBIA-SUICIDE SEVERITY RATING SCALE - C-SSRS
2. HAVE YOU ACTUALLY HAD ANY THOUGHTS OF KILLING YOURSELF?: NO
1. IN THE PAST MONTH, HAVE YOU WISHED YOU WERE DEAD OR WISHED YOU COULD GO TO SLEEP AND NOT WAKE UP?: NO
6. HAVE YOU EVER DONE ANYTHING, STARTED TO DO ANYTHING, OR PREPARED TO DO ANYTHING TO END YOUR LIFE?: NO

## 2024-10-24 NOTE — ED TRIAGE NOTES
Pt says she had episode while sitting at work where she felt her heart racing, checked pulse, 150s. Resolved spontaneously. Says no PMH.

## 2024-10-24 NOTE — ED PROVIDER NOTES
HPI   Chief Complaint   Patient presents with    Rapid Heart Rate       Patient is a 29-year-old female with past medical history of ovarian cyst presenting today for rapid heart rate.  Patient is an ENT resident here at the facility.  Was getting the OR ready for a new case when she had a sudden onset of palpitations.  They placed her on pulse ox and noted that her heart rate was in the 150s.  No chest pain or shortness of breath associated with that but does endorse lightheadedness.  She started doing Valsalva's while coming down to the ED and when she arrived to the ED, the palpitations stopped and her heart rate was in the 90s.  She reports 1 episode of this prior last month of palpitations that lasted for only a few minutes and went away on its own.  She supposed that the palpitations were from caffeine use though today she did not drink any caffeine.              Patient History   Past Medical History:   Diagnosis Date    Allergy status to unspecified drugs, medicaments and biological substances     History of seasonal allergies    Attention deficit disorder 2013    Other specified health status     No pertinent past surgical history    Personal history of other diseases of the nervous system and sense organs     History of exotropia    Personal history of other drug therapy     History of vaccination against human papillomavirus    Personal history of other infectious and parasitic diseases     History of chicken pox     Past Surgical History:   Procedure Laterality Date    WISDOM TOOTH EXTRACTION  2005     No family history on file.  Social History     Tobacco Use    Smoking status: Never    Smokeless tobacco: Never   Vaping Use    Vaping status: Never Used   Substance Use Topics    Alcohol use: Yes     Comment: occasionally    Drug use: Never       Physical Exam   ED Triage Vitals [10/24/24 1110]   Temperature Heart Rate Respirations BP   36.9 °C (98.4 °F) 92 16 125/82      Pulse Ox Temp Source Heart Rate  Source Patient Position   98 % Temporal -- --      BP Location FiO2 (%)     -- --       Physical Exam  Vitals and nursing note reviewed.   Constitutional:       General: She is not in acute distress.     Appearance: Normal appearance. She is not ill-appearing.   HENT:      Head: Normocephalic and atraumatic.      Right Ear: External ear normal.      Left Ear: External ear normal.      Nose: Nose normal. No congestion or rhinorrhea.      Mouth/Throat:      Mouth: Mucous membranes are moist.      Pharynx: Oropharynx is clear. No oropharyngeal exudate or posterior oropharyngeal erythema.   Eyes:      Extraocular Movements: Extraocular movements intact.      Conjunctiva/sclera: Conjunctivae normal.      Pupils: Pupils are equal, round, and reactive to light.   Cardiovascular:      Rate and Rhythm: Normal rate and regular rhythm.      Heart sounds: Normal heart sounds.   Pulmonary:      Effort: No accessory muscle usage or respiratory distress.      Breath sounds: Normal breath sounds. No wheezing, rhonchi or rales.   Abdominal:      General: Abdomen is flat. Bowel sounds are normal. There is no distension.      Palpations: Abdomen is soft.      Tenderness: There is no abdominal tenderness. There is no right CVA tenderness or left CVA tenderness.   Musculoskeletal:         General: No swelling or deformity. Normal range of motion.      Cervical back: Normal range of motion and neck supple.      Right lower leg: No edema.      Left lower leg: No edema.   Skin:     General: Skin is warm and dry.      Capillary Refill: Capillary refill takes less than 2 seconds.   Neurological:      General: No focal deficit present.      Mental Status: She is alert and oriented to person, place, and time.      GCS: GCS eye subscore is 4. GCS verbal subscore is 5. GCS motor subscore is 6.      Cranial Nerves: Cranial nerves 2-12 are intact.      Sensory: No sensory deficit.      Motor: Motor function is intact. No weakness.   Psychiatric:          Mood and Affect: Mood and affect normal.         Speech: Speech normal.         Behavior: Behavior normal. Behavior is cooperative.           ED Course & MDM   ED Course as of 10/24/24 1324   Thu Oct 24, 2024   1208 ECG 12 lead  Sinus tachycardia with  bpm.  CA interval 156.  QTc 441.  No ST elevation or T wave inversion.  No previous EKG to compare to. [ML]      ED Course User Index  [ML] Louise Mckeon PA-C         Diagnoses as of 10/24/24 1324   Rapid heart rate                 No data recorded                                 Medical Decision Making  29-year-old female presenting today for rapid heart rate.  On arrival, heart rate is 92.  EKG obtained showing sinus tachycardia with heart rate of 104 without ST elevation or T wave inversion.  Placed on pulse ox for continued monitoring.  During her stay in the ED, heart rate ranged from high 90s to low 100s.  CBC, CMP, troponin and TSH all within normal limits.  No further episodes of palpitations or tachycardia while in the ED.  Differentials could include vasovagal, dehydration, SVT.  Patient tolerating p.o.  Discussed considering normal heart rate in the ED and normal lab work, okay home-going.  Discussed very close follow-up with primary care for possible Holter monitor to rule out any other underlying rhythms including SVT.  Discussed strict return precautions including rapid heartbeat and palpitations again.        Procedure  Procedures     Louise Mckeon PA-C  10/24/24 7726

## 2024-12-16 ENCOUNTER — PATIENT MESSAGE (OUTPATIENT)
Dept: ENDOCRINOLOGY | Facility: CLINIC | Age: 29
End: 2024-12-16
Payer: COMMERCIAL

## 2024-12-16 ASSESSMENT — LIFESTYLE VARIABLES
TOBACCO_USE: NO
TOBACCO_USE: NO
HISTORY_ALCOHOL_USE: NO
HISTORY_ALCOHOL_USE: NO

## 2024-12-17 ENCOUNTER — CONSULT (OUTPATIENT)
Dept: ENDOCRINOLOGY | Facility: CLINIC | Age: 29
End: 2024-12-17
Payer: COMMERCIAL

## 2024-12-17 VITALS
WEIGHT: 143.5 LBS | HEIGHT: 69 IN | HEART RATE: 105 BPM | DIASTOLIC BLOOD PRESSURE: 81 MMHG | SYSTOLIC BLOOD PRESSURE: 117 MMHG | BODY MASS INDEX: 21.25 KG/M2

## 2024-12-17 DIAGNOSIS — Z31.69 PROCREATIVE MANAGEMENT COUNSELING: Primary | ICD-10-CM

## 2024-12-17 PROCEDURE — 99214 OFFICE O/P EST MOD 30 MIN: CPT

## 2024-12-17 ASSESSMENT — PAIN SCALES - GENERAL: PAINLEVEL_OUTOF10: 0-NO PAIN

## 2024-12-17 ASSESSMENT — PATIENT HEALTH QUESTIONNAIRE - PHQ9
1. LITTLE INTEREST OR PLEASURE IN DOING THINGS: NOT AT ALL
SUM OF ALL RESPONSES TO PHQ9 QUESTIONS 1 AND 2: 0
2. FEELING DOWN, DEPRESSED OR HOPELESS: NOT AT ALL
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
## 2024-12-24 ENCOUNTER — OFFICE VISIT (OUTPATIENT)
Dept: PRIMARY CARE | Facility: CLINIC | Age: 29
End: 2024-12-24
Payer: COMMERCIAL

## 2024-12-24 VITALS
OXYGEN SATURATION: 98 % | SYSTOLIC BLOOD PRESSURE: 106 MMHG | HEIGHT: 69 IN | WEIGHT: 144 LBS | BODY MASS INDEX: 21.33 KG/M2 | DIASTOLIC BLOOD PRESSURE: 70 MMHG | HEART RATE: 100 BPM

## 2024-12-24 DIAGNOSIS — I47.10 SVT (SUPRAVENTRICULAR TACHYCARDIA) (CMS-HCC): Primary | ICD-10-CM

## 2024-12-24 PROCEDURE — 99213 OFFICE O/P EST LOW 20 MIN: CPT | Performed by: STUDENT IN AN ORGANIZED HEALTH CARE EDUCATION/TRAINING PROGRAM

## 2024-12-24 PROCEDURE — 3008F BODY MASS INDEX DOCD: CPT | Performed by: STUDENT IN AN ORGANIZED HEALTH CARE EDUCATION/TRAINING PROGRAM

## 2024-12-24 NOTE — PROGRESS NOTES
"Subjective   Patient ID: Kaitlyn Olivo is a 29 y.o. female who presents for Hospital Follow-up.    HPI  #Recurrent episode of SVT  -Most recent episode 10/24/2024  Dehydrated and stressed that day   No caffeine that day   Walked into the OR, felt palpitations  Heart rate was 160 on the monitor  Valsalva'd and it would go down to 120 then come back up  Broke on way down to ED  No lightheadedness     These episodes of SVT have happened 3 or 4 times     In ED on 10/24  -EKG with sinus tach  -CBC, TSH, CMP, trop normal    Between episodes-No palpitations  No chest pain  No SOB     Ovarian cyst has gone away  Took out nexplanon and started on slynd  Taking slynd continuously   Spotting for 2 weeks     Saw CARLOS, debating egg preservation vs trying for pregnancy    Objective   Visit Vitals  /70 (BP Location: Right arm, Patient Position: Sitting, BP Cuff Size: Adult)   Pulse 100   Ht 1.74 m (5' 8.5\")   Wt 65.3 kg (144 lb)   LMP  (LMP Unknown) Comment: continuous Slynd   SpO2 98%   BMI 21.58 kg/m²   OB Status Implant   Smoking Status Never   BSA 1.78 m²      Physical Exam  General: Well appearing, conversational, in no acute distress  HEENT: EOMI, PERRL, nares patent without congestion, MMM  CV: RRR, no murmurs  Resp: Lungs CTAB, normal work of breathing  GI: Soft, nondistended, nontender, BS+   Ext: No lower ext swelling  Skin: Warm, dry, no rashes  Neuro: Awake, alert, oriented x3, moving all 4 extremities, nonfocal, normal gait, ambulates without assistance  Psych: Appropriate mood and affect      Assessment/Plan   Kaitlyn Olivo is a 29 y.o. female who presents for follow up for recurrent episodes of SVT, most recently 10/24/24.    -Holter monitor and EP referral   -Labs reassuring  Problem List Items Addressed This Visit    None  Visit Diagnoses       SVT (supraventricular tachycardia) (CMS-HCC)    -  Primary    Relevant Orders    Holter or Event Cardiac Monitor    Referral to Cardiac Electrophysiology             "     Courtney Mak MD MPH

## 2024-12-24 NOTE — PATIENT INSTRUCTIONS
Thank you for coming today Kaitlyn!    Please  your holter monitor. You can call [phone] to schedule pick-up at [facility] or [facility] or [phone] to  here.    Please schedule an appointment with Dr. Amaya as well in .     I will reach out once the holter results come back!

## 2024-12-30 ENCOUNTER — APPOINTMENT (OUTPATIENT)
Dept: PRIMARY CARE | Facility: CLINIC | Age: 29
End: 2024-12-30
Payer: COMMERCIAL

## 2025-01-02 ENCOUNTER — APPOINTMENT (OUTPATIENT)
Dept: OBSTETRICS AND GYNECOLOGY | Facility: CLINIC | Age: 30
End: 2025-01-02
Payer: COMMERCIAL

## 2025-01-16 ENCOUNTER — APPOINTMENT (OUTPATIENT)
Dept: CARDIOLOGY | Facility: HOSPITAL | Age: 30
End: 2025-01-16
Payer: COMMERCIAL

## 2025-01-23 ENCOUNTER — HOSPITAL ENCOUNTER (OUTPATIENT)
Dept: CARDIOLOGY | Facility: HOSPITAL | Age: 30
Discharge: HOME | End: 2025-01-23
Payer: COMMERCIAL

## 2025-01-23 DIAGNOSIS — I47.10 SVT (SUPRAVENTRICULAR TACHYCARDIA) (CMS-HCC): ICD-10-CM

## 2025-01-23 PROCEDURE — 93246 EXT ECG>7D<15D RECORDING: CPT

## 2025-02-13 DIAGNOSIS — Z11.3 SCREENING FOR STDS (SEXUALLY TRANSMITTED DISEASES): ICD-10-CM

## 2025-02-13 DIAGNOSIS — Z13.71 SCREENING FOR GENETIC DISEASE CARRIER STATUS: ICD-10-CM

## 2025-02-13 DIAGNOSIS — Z01.83 ENCOUNTER FOR RH BLOOD TYPING: ICD-10-CM

## 2025-02-13 DIAGNOSIS — Z31.41 FERTILITY TESTING: ICD-10-CM

## 2025-02-13 DIAGNOSIS — Z11.59 ENCOUNTER FOR SCREENING FOR OTHER VIRAL DISEASES: ICD-10-CM

## 2025-02-19 ENCOUNTER — LAB (OUTPATIENT)
Dept: LAB | Facility: HOSPITAL | Age: 30
End: 2025-02-19
Payer: COMMERCIAL

## 2025-02-19 DIAGNOSIS — Z13.71 ENCOUNTER FOR NONPROCREATIVE SCREENING FOR GENETIC DISEASE CARRIER STATUS: Primary | ICD-10-CM

## 2025-02-19 DIAGNOSIS — Z01.83 ENCOUNTER FOR BLOOD TYPING: ICD-10-CM

## 2025-02-19 LAB
ABO GROUP (TYPE) IN BLOOD: NORMAL
ANTIBODY SCREEN: NORMAL
RH FACTOR (ANTIGEN D): NORMAL

## 2025-02-19 PROCEDURE — 86850 RBC ANTIBODY SCREEN: CPT

## 2025-02-19 PROCEDURE — 86900 BLOOD TYPING SEROLOGIC ABO: CPT

## 2025-02-19 PROCEDURE — 86901 BLOOD TYPING SEROLOGIC RH(D): CPT

## 2025-02-20 ENCOUNTER — APPOINTMENT (OUTPATIENT)
Dept: LAB | Facility: HOSPITAL | Age: 30
End: 2025-02-20
Payer: COMMERCIAL

## 2025-02-22 LAB
C TRACH RRNA SPEC QL NAA+PROBE: NOT DETECTED
HBV SURFACE AG SERPL QL IA: NORMAL
MIS SERPL-MCNC: 0.37 NG/ML (ref 0.36–10.07)
N GONORRHOEA RRNA SPEC QL NAA+PROBE: NOT DETECTED
QUEST GC CT AMPLIFIED (ALWAYS MESSAGE): NORMAL

## 2025-02-23 ASSESSMENT — LIFESTYLE VARIABLES: CURRENT_SMOKER: NO

## 2025-02-24 ENCOUNTER — CONSULT (OUTPATIENT)
Dept: ENDOCRINOLOGY | Facility: CLINIC | Age: 30
End: 2025-02-24
Payer: COMMERCIAL

## 2025-02-24 VITALS
SYSTOLIC BLOOD PRESSURE: 115 MMHG | WEIGHT: 144 LBS | TEMPERATURE: 98 F | HEIGHT: 69 IN | HEART RATE: 92 BPM | BODY MASS INDEX: 21.33 KG/M2 | DIASTOLIC BLOOD PRESSURE: 78 MMHG | RESPIRATION RATE: 17 BRPM

## 2025-02-24 DIAGNOSIS — Z31.41 FERTILITY TESTING: Primary | ICD-10-CM

## 2025-02-24 PROCEDURE — 99215 OFFICE O/P EST HI 40 MIN: CPT | Mod: GC | Performed by: STUDENT IN AN ORGANIZED HEALTH CARE EDUCATION/TRAINING PROGRAM

## 2025-02-24 PROCEDURE — 99215 OFFICE O/P EST HI 40 MIN: CPT | Performed by: STUDENT IN AN ORGANIZED HEALTH CARE EDUCATION/TRAINING PROGRAM

## 2025-02-24 ASSESSMENT — PATIENT HEALTH QUESTIONNAIRE - PHQ9
SUM OF ALL RESPONSES TO PHQ9 QUESTIONS 1 AND 2: 0
2. FEELING DOWN, DEPRESSED OR HOPELESS: NOT AT ALL
1. LITTLE INTEREST OR PLEASURE IN DOING THINGS: NOT AT ALL

## 2025-02-24 ASSESSMENT — COLUMBIA-SUICIDE SEVERITY RATING SCALE - C-SSRS
2. HAVE YOU ACTUALLY HAD ANY THOUGHTS OF KILLING YOURSELF?: NO
6. HAVE YOU EVER DONE ANYTHING, STARTED TO DO ANYTHING, OR PREPARED TO DO ANYTHING TO END YOUR LIFE?: NO
1. IN THE PAST MONTH, HAVE YOU WISHED YOU WERE DEAD OR WISHED YOU COULD GO TO SLEEP AND NOT WAKE UP?: NO

## 2025-02-24 ASSESSMENT — PAIN SCALES - GENERAL: PAINLEVEL_OUTOF10: 0-NO PAIN

## 2025-02-24 NOTE — PROGRESS NOTES
Visit Type: In Person  MD reviewed, Authorization status not noted.    Follow Up Visit HPI    Patient is a 29 y.o.  female with incidental finding of low AMH level, considering fertility preservation presented today for follow up visit.     Patient on SLYND for contraception now, previously on Nexplanon and OCP.  Has Migrans with aura on OCP; Develops simple ovarian cysts on Nexplanon.     Latest Reference Range & Units Most Recent   Thyroid Stimulating Hormone 0.44 - 3.98 mIU/L 2.58  10/24/24 12:20   GLUCOSE 74 - 99 mg/dL 76  10/24/24 12:20   ANTI-MULLERIAN HORMONE (AMH), MALE 0.36 - 10.07 ng/mL 0.37  25 15:53       Hysterosalpingogram: n/a  Saline Infused Sonography: n/a  GYN Pelvic Ultrasound: : right ovarian cyst approx 4 cm in diameter    Partner SA: NA      Past Medical History:   Diagnosis Date    Allergy status to unspecified drugs, medicaments and biological substances     History of seasonal allergies    Attention deficit disorder 2013    Other specified health status     No pertinent past surgical history    Personal history of other diseases of the nervous system and sense organs     History of exotropia    Personal history of other drug therapy     History of vaccination against human papillomavirus    Personal history of other infectious and parasitic diseases     History of chicken pox     Past Surgical History:   Procedure Laterality Date    WISDOM TOOTH EXTRACTION       Current Outpatient Medications on File Prior to Visit   Medication Sig Dispense Refill    amphetamine-dextroamphetamine (Adderall) 10 mg tablet Take 1 tablet (10 mg) by mouth if needed.      drospirenone, contraceptive, 4 mg (28) tablet Take 1 tablet by mouth once daily. 90 tablet 3    omeprazole (PriLOSEC) 40 mg DR capsule Take 1 capsule (40 mg) by mouth if needed. Do not crush or chew.       No current facility-administered medications on file prior to visit.       BMI:   BMI Readings from Last 1 Encounters:  "  25 21.27 kg/m²     VITALS:  /78   Pulse 92   Temp 36.7 °C (98 °F) (Oral)   Resp 17   Ht 1.753 m (5' 9\")   Wt 65.3 kg (144 lb)   LMP 2025 (Exact Date)   BMI 21.27 kg/m²   LMP: Patient's last menstrual period was 2025 (exact date).    ASSESSMENT   29 y.o.  female with simple ovarian cysts on progesterone contraception, PMDD and migrans with aura on combined OCP here for options regarding fertility preservation.    Patient and partner considering pregnancy in 2025;  Patient Myriad testing is pending  Partner Myriad testing is ordered.    COUNSELING  We discussed options for fertility preservation including oocyte cryopreservation and embryo cryopreservation, and discussed advances in cryopreservation specifically the optimization of vitrification to enhance oocyte freezing and enhance the likelihood of pregnancies after thaw.  Discussed with patient the clinical data that currently exists about efficacy of oocyte cryopreservation as a strategy for fertility preservation and that this is an evolving area. At present several concepts have been demonstrated. Increased maternal age likely impacts total egg yield and likelihood of pregnancy after thaw. Offspring outcomes appear to be comparable for children conceived after IVF and oocyte freezing/thaw/IVF but research in this area is evolving and much more outcomes data exists for children conceived after embryo cryo than oocyte cryo. An upper limit on duration of time that oocytes can be frozen and still result in a pregnancy has not been defined. There is no guarantee for a pregnancy with any amount of oocytes cryopreserved. Reviewed nature of stimulation, injectable hormones used, and monitoring process and the process of oocyte retrieval as an outpatient surgical procedure to harvest oocytes. Risks of this procedure include ovarian hyperstimulation syndrome, anesthesia risks during egg retrieval. Reviewed need to consult " with financial specialists in our office to delineate coverage and costs.    Routine Testing  Fertility Center  STDs Within 1 year   Genetic carrier Waiver/Completed   T&S Within 1 year   AMH Within 1 year   TSH Within 1 year   Rubella/Varicella Within 5 years     BMI Testing  Fertility Center  CBC Within 1 year   CMP Within 1 year   HgbA1c Within 1 year   Mag, Phos, Vit D <18 Within 1 year   MFM > 40  REQ   Wt loss consult > 40 OPT     PLAN  Orders Placed This Encounter   Procedures    US pelvis transvaginal       FOLLOW UP   Consults:  none at this time  Engaged MD  Take prenatal vitamins, vitamin D 2000 IUs daily  Discussed that treatment cannot proceed until checklist items are complete.   Additional testing for BMI < 18 or > 40: NA.  Patient to schedule follow up visit with [facility] team if unable to conceive 12 months of unprotected intercourse with regular menstrual period.    MD Completion:  Ectopic Risk: No  Medically Complex: No    Fertility Plan Update:  Kaitlyn and Anmol presented today to assess options regarding natural conception vs. IVF with genetic testing to assure they can store good quality embryos for future use.    We had at length discussion regarding the natural conception, age related decline in fertility, when and if patients require IVF treatments, other options for managing infertility; we additionally discussed the [facility] testing at length and the pros and cons with the genetic testing, that [facility] offers us today.    After the discussion, Kaitlyn and Anmol are feeling reassured to TTC naturally first, and follow up with us after Myriad screening to see if they have any concordant traits.    -Patient to stop the Slynd whenever ready to TTC  -once stop Slynd may restart luteal cetrizine for PMDD management  -Pelvic US for assessment of ovaries for possible ovarian cysts  -May repeat the AMH 3 months after stopping OCP for assessment of the representative AMH level.  -MyChart message us with the  result of Myriad screening for patient and partner for evaluation of concordant traits.    Intimate Exam Performed: No, an intimate exam was not performed at this encounter.     Sheila Dumont MD PGY7  02/24/2025  1:05 PM

## 2025-03-03 ENCOUNTER — DOCUMENTATION (OUTPATIENT)
Dept: ENDOCRINOLOGY | Facility: CLINIC | Age: 30
End: 2025-03-03
Payer: COMMERCIAL

## 2025-03-03 NOTE — PROGRESS NOTES
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 Carriers generally do not experience symptoms. V37I is typically associated with bilateral mild to moderate and slowly progressive hearing loss.    carrier of POMGNT1-related disorders. Carriers generally do not experience symptoms.     [   ] Genetic carrier testing reviewed and NEGATIVE result noted.    Additional actions:  [   ] Ok to proceed with next steps, no additional genetic testing or counseling recommended  [  X ] Awaiting partner testing: spouse Anmol Nogueira  10/4/1992 had Myriad drawn 2025- still pending   [   ] Partner testing reviewed and no concordance. Ok to proceed with planned treatments.     Neena Perea CNP 25 11:45 AM

## 2025-03-06 LAB
COMMENTS - MP RESULT TYPE: NORMAL
SCAN RESULT: NORMAL

## 2025-08-08 ENCOUNTER — PATIENT MESSAGE (OUTPATIENT)
Dept: OBSTETRICS AND GYNECOLOGY | Facility: CLINIC | Age: 30
End: 2025-08-08
Payer: COMMERCIAL

## 2025-08-08 DIAGNOSIS — Z30.011 BCP (BIRTH CONTROL PILLS) INITIATION: ICD-10-CM

## 2025-08-08 NOTE — TELEPHONE ENCOUNTER
Patient last seen in office on 9/4/2024. Reminder sent to patient to schedule annual exam prior to further refills. Refill pended to covering provider for review.

## 2025-09-25 ENCOUNTER — APPOINTMENT (OUTPATIENT)
Dept: DERMATOLOGY | Facility: CLINIC | Age: 30
End: 2025-09-25
Payer: COMMERCIAL